# Patient Record
(demographics unavailable — no encounter records)

---

## 2025-07-02 NOTE — DISCUSSION/SUMMARY
[FreeTextEntry1] : 1. Chest Pain: associated dizziness and dyspnea. Patient with significant risk factor in that he is a smoker. Advising echocardiogram and CCTA to evaluate the coronary artery anatomy.   Complete smoking cessation of utmost importance.

## 2025-07-02 NOTE — HISTORY OF PRESENT ILLNESS
[FreeTextEntry1] : 44 year old male, active smoker (1ppd), no other PMHx presents for a cardiac evaluation because of chest pain. This has been occurring almost daily for about two months. Described as a dull ache in center of his chest that comes on randomly and lasts for about 1 minute. Not exertional, however, he does have dyspnea and dizziness with it. He already seen GI who adjusted GERD medication. Patient went to [facility] ED for this chest pain, 6/28/2025, and his ECG was normal. He had negative troponin and all other labs within normal limits.  There is no history of MI, CVA, CHF, or previous coronary intervention.  Mother with heart issues, but he doesn't know the specifics.

## 2025-07-02 NOTE — PHYSICAL EXAM
[Normal] : moves all extremities, no focal deficits, normal speech [de-identified] :  No carotid bruits auscultated bilaterally.